# Patient Record
Sex: MALE | Race: WHITE | NOT HISPANIC OR LATINO
[De-identification: names, ages, dates, MRNs, and addresses within clinical notes are randomized per-mention and may not be internally consistent; named-entity substitution may affect disease eponyms.]

---

## 2017-01-06 ENCOUNTER — APPOINTMENT (OUTPATIENT)
Dept: ORTHOPEDIC SURGERY | Facility: CLINIC | Age: 67
End: 2017-01-06

## 2017-01-20 ENCOUNTER — APPOINTMENT (OUTPATIENT)
Dept: ORTHOPEDIC SURGERY | Facility: CLINIC | Age: 67
End: 2017-01-20

## 2017-02-03 ENCOUNTER — APPOINTMENT (OUTPATIENT)
Dept: ORTHOPEDIC SURGERY | Facility: CLINIC | Age: 67
End: 2017-02-03

## 2017-02-10 ENCOUNTER — APPOINTMENT (OUTPATIENT)
Dept: ORTHOPEDIC SURGERY | Facility: CLINIC | Age: 67
End: 2017-02-10

## 2017-02-17 ENCOUNTER — APPOINTMENT (OUTPATIENT)
Dept: ORTHOPEDIC SURGERY | Facility: CLINIC | Age: 67
End: 2017-02-17

## 2017-03-09 ENCOUNTER — APPOINTMENT (OUTPATIENT)
Dept: ORTHOPEDIC SURGERY | Facility: CLINIC | Age: 67
End: 2017-03-09

## 2017-03-09 VITALS — WEIGHT: 165 LBS | RESPIRATION RATE: 16 BRPM | BODY MASS INDEX: 25.59 KG/M2 | HEIGHT: 67.5 IN

## 2017-03-09 DIAGNOSIS — M65.341 TRIGGER FINGER, RIGHT RING FINGER: ICD-10-CM

## 2017-03-09 DIAGNOSIS — M72.0 PALMAR FASCIAL FIBROMATOSIS [DUPUYTREN]: ICD-10-CM

## 2017-03-09 DIAGNOSIS — R22.32 LOCALIZED SWELLING, MASS AND LUMP, LEFT UPPER LIMB: ICD-10-CM

## 2017-03-09 RX ORDER — MUPIROCIN 2 G/100G
2 CREAM TOPICAL
Qty: 15 | Refills: 0 | Status: ACTIVE | COMMUNITY
Start: 2017-03-08

## 2017-09-19 ENCOUNTER — APPOINTMENT (OUTPATIENT)
Dept: ORTHOPEDIC SURGERY | Facility: CLINIC | Age: 67
End: 2017-09-19

## 2017-09-26 ENCOUNTER — APPOINTMENT (OUTPATIENT)
Dept: ORTHOPEDIC SURGERY | Facility: CLINIC | Age: 67
End: 2017-09-26

## 2017-10-03 ENCOUNTER — APPOINTMENT (OUTPATIENT)
Dept: ORTHOPEDIC SURGERY | Facility: CLINIC | Age: 67
End: 2017-10-03

## 2018-01-19 ENCOUNTER — APPOINTMENT (OUTPATIENT)
Dept: ORTHOPEDIC SURGERY | Facility: CLINIC | Age: 68
End: 2018-01-19
Payer: COMMERCIAL

## 2018-01-19 VITALS — WEIGHT: 165 LBS | BODY MASS INDEX: 25.9 KG/M2 | HEIGHT: 67 IN

## 2018-01-19 PROCEDURE — 20610 DRAIN/INJ JOINT/BURSA W/O US: CPT | Mod: RT

## 2018-01-19 RX ORDER — AMLODIPINE BESYLATE 5 MG/1
5 TABLET ORAL
Qty: 30 | Refills: 0 | Status: ACTIVE | COMMUNITY
Start: 2017-03-29

## 2018-01-19 RX ORDER — AZITHROMYCIN 250 MG/1
250 TABLET, FILM COATED ORAL
Qty: 6 | Refills: 0 | Status: DISCONTINUED | COMMUNITY
Start: 2017-11-28

## 2018-01-19 RX ORDER — VALACYCLOVIR 500 MG/1
500 TABLET, FILM COATED ORAL
Qty: 30 | Refills: 0 | Status: ACTIVE | COMMUNITY
Start: 2017-06-23

## 2018-01-19 RX ORDER — VALSARTAN AND HYDROCHLOROTHIAZIDE 160; 25 MG/1; MG/1
160-25 TABLET, FILM COATED ORAL
Qty: 90 | Refills: 0 | Status: DISCONTINUED | COMMUNITY
Start: 2016-11-14 | End: 2018-01-19

## 2018-01-25 ENCOUNTER — APPOINTMENT (OUTPATIENT)
Dept: ORTHOPEDIC SURGERY | Facility: CLINIC | Age: 68
End: 2018-01-25
Payer: COMMERCIAL

## 2018-01-25 PROCEDURE — 20610 DRAIN/INJ JOINT/BURSA W/O US: CPT | Mod: RT

## 2018-02-13 ENCOUNTER — APPOINTMENT (OUTPATIENT)
Dept: ORTHOPEDIC SURGERY | Facility: CLINIC | Age: 68
End: 2018-02-13
Payer: COMMERCIAL

## 2018-02-13 VITALS — BODY MASS INDEX: 25.9 KG/M2 | WEIGHT: 165 LBS | HEIGHT: 67 IN

## 2018-02-13 PROCEDURE — 20610 DRAIN/INJ JOINT/BURSA W/O US: CPT | Mod: RT

## 2018-03-06 ENCOUNTER — APPOINTMENT (OUTPATIENT)
Dept: ORTHOPEDIC SURGERY | Facility: CLINIC | Age: 68
End: 2018-03-06

## 2018-03-13 ENCOUNTER — APPOINTMENT (OUTPATIENT)
Dept: ORTHOPEDIC SURGERY | Facility: CLINIC | Age: 68
End: 2018-03-13
Payer: COMMERCIAL

## 2018-03-13 VITALS — HEIGHT: 67 IN | WEIGHT: 165 LBS | BODY MASS INDEX: 25.9 KG/M2

## 2018-03-13 PROCEDURE — 99214 OFFICE O/P EST MOD 30 MIN: CPT

## 2018-12-21 ENCOUNTER — APPOINTMENT (OUTPATIENT)
Dept: ORTHOPEDIC SURGERY | Facility: CLINIC | Age: 68
End: 2018-12-21

## 2019-02-27 ENCOUNTER — APPOINTMENT (OUTPATIENT)
Dept: ORTHOPEDIC SURGERY | Facility: CLINIC | Age: 69
End: 2019-02-27
Payer: COMMERCIAL

## 2019-02-27 PROCEDURE — 20610 DRAIN/INJ JOINT/BURSA W/O US: CPT | Mod: RT

## 2019-03-01 NOTE — PROCEDURE
[de-identified] : cc: right knee pain and stiffness\par Dx; Right knee DJD \par \par Under strict sterile technique, the right knee was prepped with Betadine. Using the superolateral approach, with the patient supine, a 2mL injection of Euflexxa was administered intra-articularly . The patient tolerated the procedure well. The patient was instructed to avoid vigorous exercise for 24 hours and will apply ice to the knee for 20 minutes 2-3 times per day if discomfort occurs. Patient will return next week for the next injection in the series. The patient will call if any questions or problems should arise. \par \par [id number]\par 2019-12-09\par

## 2019-03-06 ENCOUNTER — APPOINTMENT (OUTPATIENT)
Dept: ORTHOPEDIC SURGERY | Facility: CLINIC | Age: 69
End: 2019-03-06

## 2019-03-15 ENCOUNTER — APPOINTMENT (OUTPATIENT)
Dept: ORTHOPEDIC SURGERY | Facility: CLINIC | Age: 69
End: 2019-03-15
Payer: COMMERCIAL

## 2019-03-15 PROCEDURE — 20610 DRAIN/INJ JOINT/BURSA W/O US: CPT | Mod: RT

## 2019-03-15 NOTE — PROCEDURE
[de-identified] : cc: right knee pain and stiffness\par Dx; Right knee DJD \par \par Under strict sterile technique, the right knee was prepped with Betadine. Using the superolateral approach, with the patient supine, a 2mL injection of Euflexxa was administered intra-articularly . The patient tolerated the procedure well. The patient was instructed to avoid vigorous exercise for 24 hours and will apply ice to the knee for 20 minutes 2-3 times per day if discomfort occurs. Patient will return next week for the next injection in the series. The patient will call if any questions or problems should arise. \par \par [id number]\par 2019-12-09\par

## 2019-03-22 ENCOUNTER — APPOINTMENT (OUTPATIENT)
Dept: ORTHOPEDIC SURGERY | Facility: CLINIC | Age: 69
End: 2019-03-22
Payer: COMMERCIAL

## 2019-03-22 PROCEDURE — 20610 DRAIN/INJ JOINT/BURSA W/O US: CPT | Mod: RT

## 2019-03-22 RX ORDER — ALPRAZOLAM 0.5 MG/1
0.5 TABLET ORAL
Qty: 30 | Refills: 0 | Status: COMPLETED | COMMUNITY
Start: 2018-10-10

## 2019-03-22 RX ORDER — ALPRAZOLAM 0.25 MG/1
0.25 TABLET ORAL
Qty: 60 | Refills: 0 | Status: ACTIVE | COMMUNITY
Start: 2019-03-05

## 2019-03-22 RX ORDER — SODIUM SULFATE, POTASSIUM SULFATE, MAGNESIUM SULFATE 17.5; 3.13; 1.6 G/ML; G/ML; G/ML
17.5-3.13-1.6 SOLUTION, CONCENTRATE ORAL
Qty: 354 | Refills: 0 | Status: COMPLETED | COMMUNITY
Start: 2018-12-10 | End: 2019-03-22

## 2019-03-22 RX ORDER — ALPRAZOLAM 2 MG/1
2 TABLET ORAL
Qty: 30 | Refills: 0 | Status: COMPLETED | COMMUNITY
Start: 2016-12-30 | End: 2019-03-22

## 2019-03-29 NOTE — PROCEDURE
[de-identified] : Indication: Right knee DJD\par CC: Right knee pain and stiffness\par \par The patient returns today for the third injection in the series. Under strict sterile technique, the right knee was prepped with Betadine. Using the superolateral approach, with the patient supine, a 2mL injection of Euflexxa was administered intra-articularly. The patient tolerated the procedure well. The patient was instructed to avoid vigorous exercise for 24 hours and will apply ice to the knee for 20 minutes 2-3 times per day if discomfort occurs. The patient will return for follow up on an as needed basis and will call if any additional questions or problems should arise.\par \par Euflexxa injection # 3 - Right Knee\par Lot #: [id number]\par Exp: 12-\par Man: Yehuda\par NDC: [id number]

## 2019-09-04 ENCOUNTER — RX RENEWAL (OUTPATIENT)
Age: 69
End: 2019-09-04

## 2019-09-05 ENCOUNTER — APPOINTMENT (OUTPATIENT)
Dept: OTOLARYNGOLOGY | Facility: CLINIC | Age: 69
End: 2019-09-05

## 2019-10-03 ENCOUNTER — APPOINTMENT (OUTPATIENT)
Dept: OTOLARYNGOLOGY | Facility: CLINIC | Age: 69
End: 2019-10-03
Payer: COMMERCIAL

## 2019-10-03 VITALS
DIASTOLIC BLOOD PRESSURE: 74 MMHG | WEIGHT: 165 LBS | RESPIRATION RATE: 53 BRPM | SYSTOLIC BLOOD PRESSURE: 94 MMHG | BODY MASS INDEX: 25.9 KG/M2 | HEIGHT: 67 IN

## 2019-10-03 DIAGNOSIS — H90.A21 SENSORINEURAL HEARING LOSS, UNILATERAL, RIGHT EAR, WITH RESTRICTED HEARING ON THE CONTRALATERAL SIDE: ICD-10-CM

## 2019-10-03 DIAGNOSIS — H90.8 MIXED CONDUCTIVE AND SENSORINEURAL HEARING LOSS, UNSPECIFIED: ICD-10-CM

## 2019-10-03 PROCEDURE — 92550 TYMPANOMETRY & REFLEX THRESH: CPT

## 2019-10-03 PROCEDURE — 99203 OFFICE O/P NEW LOW 30 MIN: CPT | Mod: 25

## 2019-10-03 PROCEDURE — 92504 EAR MICROSCOPY EXAMINATION: CPT

## 2019-10-03 PROCEDURE — 31231 NASAL ENDOSCOPY DX: CPT

## 2019-10-03 PROCEDURE — 92557 COMPREHENSIVE HEARING TEST: CPT

## 2019-10-03 RX ORDER — FLUTICASONE PROPIONATE 50 UG/1
50 SPRAY, METERED NASAL DAILY
Qty: 1 | Refills: 5 | Status: ACTIVE | COMMUNITY
Start: 2019-10-03 | End: 1900-01-01

## 2019-10-03 RX ORDER — TRETINOIN 0.2 MG/G
0.02 CREAM TOPICAL
Qty: 40 | Refills: 0 | Status: DISCONTINUED | COMMUNITY
Start: 2018-12-03 | End: 2019-10-03

## 2019-10-03 NOTE — ASSESSMENT
[FreeTextEntry1] : Significant hearing loss in the left ear with air-bone gaps. Etiologies discussed in detail with the patient and his wife. Management options reviewed in detail. He wished to consider surgical intervention.All risks limitations, complications and alternatives reviewed in detail.  Questions answered.\par \par CT scan of the temporal bone recommended. Followup in this office for a CT review recommended for further management discussion.\par \par Consistent steroidal nasal spray trial recommended for chronic rhinitis.

## 2019-10-03 NOTE — DATA REVIEWED
[de-identified] : Complete audiometry was ordered and completed today. This was separately reported by the audiologist. The results were reviewed in detail with the patient.\par \par

## 2019-10-03 NOTE — HISTORY OF PRESENT ILLNESS
[de-identified] : AMARILIS JARQUIN has a history of hearing loss in the left ear for about 1 year. Gradual onset for 2 - 3 years without precipitating event.  there has interfered with daily communications.  Patient reports no exposure to loud noises or previous trauma. No family history of hearing loss or deafness. No pain, no tinnitus and no vertigo reported at this visit. \par Long-standing history of nasal obstruction reported.

## 2019-10-03 NOTE — PHYSICAL EXAM
[Midline] : trachea located in midline position [Normal] : no rashes [FreeTextEntry1] : Procedure: Microscopic Ear Exam\par \par Left ear:  \par Ear canal patent. Tympanic membranes intact. Inferior myringosclerosis. No acute inflammation. No visible effusion or lesion.\par \par \par Right ear:  \par Ear canal patent. Mild tympanic membrane retraction the posterior superior quadrant and the pars flaccida region. No inflammation or lesion. Cannot rule out effusion.\par \par \par \par [place] Hearing Assessment\par 512 Hz:\par Otero test: referred to the left ear\par Rinne test:\par 	Right Ear: Air Conduction > Bone Conduction\par 	Left Ear:   Air Conduction < Bone conduction

## 2019-10-03 NOTE — CONSULT LETTER
[Please see my note below.] : Please see my note below. [FreeTextEntry1] : Thank you for allowing me to participate in the care of your patient.\par Please see the attached visit note.\par \par \par \par Ronal Alejandro\par Otology\par Department of Otolaryngology\par [facility]\par  [FreeTextEntry2] : Dear MAGDALENA ELIZONDO

## 2019-10-03 NOTE — PROCEDURE
[FreeTextEntry3] : PROCEDURE:  NASAL ENDOSCOPY  \par \par Surgeon: Dr. Alejandro\par Indication: Chronic Rhinitis\par Anesthetic: Topical lidocaine and Afrin\par Procedure: The patient was placed in a sitting position.  Following application of the topical anesthetic and decongestant, exam was performed with a flexible endoscope.  The scope was passed along the right nasal floor to the nasopharynx.  It was then passed into the region of the middle meatus, middle turbinate, and sphenoethmoid region.  An identical procedure was performed on the left side.  The following findings were noted:\par \par Nasal mucosa:   Mild edema\par Mucous: Normal\par \par Septum:[midline, leftward deviation, righward deviation]\par \par \par Right nasal cavity\par      Right middle meatus: no pus, polyps or congestion \par      Right sphenoethmoidal recess: no pus, polyps or congestion  \par      InferiorTurbinates: Intact and non inflammed\par      Middle Turbinates: Moderately enlarged with edema. No polypoid changes.\par      SuperiorTurbinates: Intact and non inflammed\par \par Left nasal cavity\par      Left middle meatus: no pus, polyps, or congestion\par      Left sphenoethmoidal recess: no pus, polyps or congestion \par      InferiorTurbinates: Intact and non inflammed  \par      Middle Turbinates:Moderately enlarged with edema. No polypoid changes.\par      SuperiorTurbinates: Intact and non inflammed\par \par Nasopharynx: no masses, choanae patent, no adenoid tissue\par

## 2019-10-15 ENCOUNTER — APPOINTMENT (OUTPATIENT)
Dept: ORTHOPEDIC SURGERY | Facility: CLINIC | Age: 69
End: 2019-10-15
Payer: COMMERCIAL

## 2019-10-15 PROCEDURE — 20610 DRAIN/INJ JOINT/BURSA W/O US: CPT | Mod: RT

## 2019-10-16 NOTE — PROCEDURE
[de-identified] : Indication: right knee pain and stiffness\par DX; Right knee DJD\par \par Under strict sterile technique, the right knee was prepped with Betadine. Using the superolateral approach, with the patient supine, a 2mL injection of Euflexxa was administered intra-articularly . The patient tolerated the procedure well. The patient was instructed to avoid vigorous exercise for 24 hours and will apply ice to the knee for 20 minutes 2-3 times per day if discomfort occurs. Patient will return next week for the next injection in the series. The patient will call if any questions or problems should arise. \par \par \par \par Euflexxa injection # 1 - Right knee Joint\par Lot #: [id number]\par Exp: 09-\par Man: Yehuda\par NDC: [id number]

## 2019-10-22 ENCOUNTER — APPOINTMENT (OUTPATIENT)
Dept: ORTHOPEDIC SURGERY | Facility: CLINIC | Age: 69
End: 2019-10-22
Payer: COMMERCIAL

## 2019-10-22 VITALS
HEIGHT: 67 IN | DIASTOLIC BLOOD PRESSURE: 70 MMHG | BODY MASS INDEX: 25.9 KG/M2 | WEIGHT: 165 LBS | SYSTOLIC BLOOD PRESSURE: 110 MMHG

## 2019-10-22 PROCEDURE — 20610 DRAIN/INJ JOINT/BURSA W/O US: CPT | Mod: RT

## 2019-10-23 NOTE — PROCEDURE
[de-identified] : Indication: right knee pain and stiffness\par The patient returns today for the second injection in the series. Under strict sterile technique, the right knee was prepped with Betadine. Using the superolateral approach, with the patient supine, a 2mL injection of  Euflexxa was administered intra-articularly . The patient tolerated the procedure well. The patient was instructed to avoid vigorous exercise for 24 hours and will apply ice to the knee for 20 minutes 2-3 times per day  if discomfort occurs. Patient will return next week for the next injection in the series. The patient will call if any questions or problems should arise. \par \par \par Euflexxa injection # 2 - Right knee joint\par Lot #: [id number]\par Exp: 09-\par Man: Yehuda\par NDC: [id number]

## 2019-10-24 ENCOUNTER — APPOINTMENT (OUTPATIENT)
Dept: OTOLARYNGOLOGY | Facility: CLINIC | Age: 69
End: 2019-10-24
Payer: COMMERCIAL

## 2019-10-24 VITALS
RESPIRATION RATE: 83 BRPM | DIASTOLIC BLOOD PRESSURE: 99 MMHG | WEIGHT: 165 LBS | BODY MASS INDEX: 25.9 KG/M2 | SYSTOLIC BLOOD PRESSURE: 152 MMHG | HEIGHT: 67 IN

## 2019-10-24 DIAGNOSIS — J30.0 VASOMOTOR RHINITIS: ICD-10-CM

## 2019-10-24 DIAGNOSIS — H90.A32 MIXED CONDUCTIVE AND SENSORINEURAL HEARING, UNILATERAL, LEFT EAR WITH RESTRICTED HEARING ON THE  CONTRALATERAL SIDE: ICD-10-CM

## 2019-10-24 DIAGNOSIS — H80.93 UNSPECIFIED OTOSCLEROSIS, BILATERAL: ICD-10-CM

## 2019-10-24 PROCEDURE — 99213 OFFICE O/P EST LOW 20 MIN: CPT

## 2019-10-24 NOTE — HISTORY OF PRESENT ILLNESS
[de-identified] : AMARILIS JARQUIN has a history of hearing loss in the left ear for about 1 year. Gradual onset for 2 - 3 years without precipitating event.  there has interfered with daily communications.  Patient reports no exposure to loud noises or previous trauma. No family history of hearing loss or deafness. No pain, no tinnitus and no vertigo reported at this visit. \par Long-standing history of nasal obstruction reported. [FreeTextEntry1] : 10/24/2019 Patient is being seen to review CT Scan. Patient reports of no perceived changes in the hearing. Compliant with the use of nasal spray. He feels that his nasal breathing has improved.

## 2019-10-24 NOTE — CONSULT LETTER
[Please see my note below.] : Please see my note below. [FreeTextEntry2] : Dear MAGDALENA ELIZONDO  [FreeTextEntry1] : Thank you for allowing me to participate in the care of AMARILIS JARQUIN .\par Please see the attached visit note.\par \par \par \par Ronal Alejandro\par Otology\par Department of Otolaryngology\par [facility]

## 2019-10-24 NOTE — ASSESSMENT
[FreeTextEntry1] : Management options carefully reviewed for left mixed hearing loss. I have encouraged him to consider a hearing aid trial. He may wish to consider surgical intervention. I have reviewed this with them in detail.\par \par Clinical followup recommended in 6 months.

## 2019-10-24 NOTE — PHYSICAL EXAM
[FreeTextEntry1] : Procedure: Microscopic Ear Exam\par \par Left ear:  Ear canal intact without inflammation or lesion.  \par Tympanic membrane intact without inflammation.\par \par Right ear:  Ear canal intact without inflammation or lesion.  \par Tympanic membrane intact without inflammation.\par \par  [Normal] : lingual tonsils are normal [Midline] : trachea located in midline position

## 2019-10-29 ENCOUNTER — APPOINTMENT (OUTPATIENT)
Dept: ORTHOPEDIC SURGERY | Facility: CLINIC | Age: 69
End: 2019-10-29
Payer: COMMERCIAL

## 2019-10-29 PROCEDURE — 20610 DRAIN/INJ JOINT/BURSA W/O US: CPT | Mod: RT

## 2019-10-29 RX ORDER — HYALURONATE SODIUM 20 MG/2 ML
20 SYRINGE (ML) INTRAARTICULAR
Qty: 1 | Refills: 0 | Status: COMPLETED | COMMUNITY
Start: 2019-09-04 | End: 2019-10-29

## 2019-10-31 NOTE — PROCEDURE
[de-identified] : Indication: right knee pain and stiffness\par \par The patient returns today for the third injection in the series. Under strict sterile technique, the right knee was prepped with Betadine. Using the superolateral approach, with the patient supine, a 2mL injection of Euflexxa was administered intra-articularly. The patient tolerated the procedure well. The patient was instructed to avoid vigorous exercise for 24 hours and will apply ice to the knee for 20 minutes 2-3 times per day if discomfort occurs. The patient will return for follow up on an as needed basis and will call if any additional questions or problems should arise.\par \par Euflexxa injection # 3 - Right knee joint\par Lot #: [id number]\par Exp: 09-\par Man: Yehuda\par NDC: [id number]

## 2019-11-07 ENCOUNTER — APPOINTMENT (OUTPATIENT)
Dept: OTOLARYNGOLOGY | Facility: CLINIC | Age: 69
End: 2019-11-07

## 2020-01-09 ENCOUNTER — APPOINTMENT (OUTPATIENT)
Dept: ORTHOPEDIC SURGERY | Facility: CLINIC | Age: 70
End: 2020-01-09
Payer: MEDICARE

## 2020-01-09 ENCOUNTER — APPOINTMENT (OUTPATIENT)
Dept: OTOLARYNGOLOGY | Facility: CLINIC | Age: 70
End: 2020-01-09

## 2020-01-09 VITALS
HEIGHT: 67 IN | SYSTOLIC BLOOD PRESSURE: 130 MMHG | DIASTOLIC BLOOD PRESSURE: 90 MMHG | BODY MASS INDEX: 25.9 KG/M2 | WEIGHT: 165 LBS | OXYGEN SATURATION: 98 % | HEART RATE: 74 BPM

## 2020-01-09 PROCEDURE — 99213 OFFICE O/P EST LOW 20 MIN: CPT

## 2020-01-21 NOTE — END OF VISIT
[FreeTextEntry3] : All medical record entries made by REBA Hutchins, acting as a scribe for this encounter under the direction of Federico Weber MD . I have reviewed the chart and agree that the record accurately reflects my personal performance of the history, physical exam, assessment and plan. I have also personally directed, reviewed, and agreed with the chart.

## 2020-01-21 NOTE — DISCUSSION/SUMMARY
[de-identified] : Chalino is doing well following his HA injections. He will continue on with activities as tolerated. We will see him back on an as needed basis. He will call if any issues arise.

## 2020-01-21 NOTE — HISTORY OF PRESENT ILLNESS
[de-identified] : sam returns today for evaluation of his right knee. he reports improvement from his HA injections. He denies any current pain or swelling. He denies any locking or buckling.

## 2020-04-30 ENCOUNTER — APPOINTMENT (OUTPATIENT)
Dept: OTOLARYNGOLOGY | Facility: CLINIC | Age: 70
End: 2020-04-30

## 2020-09-17 ENCOUNTER — APPOINTMENT (OUTPATIENT)
Dept: ORTHOPEDIC SURGERY | Facility: CLINIC | Age: 70
End: 2020-09-17

## 2020-09-24 ENCOUNTER — APPOINTMENT (OUTPATIENT)
Dept: ORTHOPEDIC SURGERY | Facility: CLINIC | Age: 70
End: 2020-09-24
Payer: MEDICARE

## 2020-09-24 PROCEDURE — 20610 DRAIN/INJ JOINT/BURSA W/O US: CPT | Mod: RT

## 2020-09-25 NOTE — PROCEDURE
[de-identified] : cc; Right knee pain and stiffness\par \par Under strict sterile technique, the right knee was prepped with Betadine. Using the superolateral approach, with the patient supine, a 2mL injection of Euflexxa was administered intra-articularly . The patient tolerated the procedure well. The patient was instructed to avoid vigorous exercise for 24 hours and will apply ice to the knee for 20 minutes 2-3 times per day if discomfort occurs. Patient will return next week for the next injection in the series. The patient will call if any questions or problems should arise.\par \par \par euflexxa \par right knee \par 1 of 3\par Lot: [id number]\par exp: 2021-03-21\par ndc: [id number]

## 2020-10-01 ENCOUNTER — APPOINTMENT (OUTPATIENT)
Dept: ORTHOPEDIC SURGERY | Facility: CLINIC | Age: 70
End: 2020-10-01
Payer: MEDICARE

## 2020-10-01 VITALS
TEMPERATURE: 97.8 F | HEART RATE: 91 BPM | BODY MASS INDEX: 25.9 KG/M2 | SYSTOLIC BLOOD PRESSURE: 120 MMHG | HEIGHT: 67 IN | OXYGEN SATURATION: 97 % | WEIGHT: 165 LBS | DIASTOLIC BLOOD PRESSURE: 70 MMHG

## 2020-10-01 PROCEDURE — 20610 DRAIN/INJ JOINT/BURSA W/O US: CPT | Mod: RT

## 2020-10-02 NOTE — PROCEDURE
[de-identified] : Indication: Pain and stiffness of the right knee\par \par The patient returns today for the second injection in the series. Under strict sterile technique, the right knee was prepped with Betadine. Using the superolateral approach, with the patient supine, a 2mL injection of Euflexxa was administered intra-articularly . The patient tolerated the procedure well. The patient was instructed to avoid vigorous exercise for 24 hours and will apply ice to the knee for 20 minutes 2-3 times per day if discomfort occurs. Patient will return next week for the next injection in the series. The patient will call if any questions or problems should arise.\par \par Euflexxa injection # 2 - Right knee joint\par Lot #: [id number]\par Exp: 03-\par Man: Yehuda\par NDC: [id number]

## 2020-10-15 ENCOUNTER — APPOINTMENT (OUTPATIENT)
Dept: ORTHOPEDIC SURGERY | Facility: CLINIC | Age: 70
End: 2020-10-15
Payer: MEDICARE

## 2020-10-15 VITALS
HEART RATE: 92 BPM | HEIGHT: 67 IN | WEIGHT: 165 LBS | BODY MASS INDEX: 25.9 KG/M2 | SYSTOLIC BLOOD PRESSURE: 110 MMHG | TEMPERATURE: 97.9 F | DIASTOLIC BLOOD PRESSURE: 70 MMHG | OXYGEN SATURATION: 96 %

## 2020-10-15 PROCEDURE — 20610 DRAIN/INJ JOINT/BURSA W/O US: CPT | Mod: RT

## 2020-10-15 RX ORDER — HYALURONATE SODIUM 20 MG/2 ML
20 SYRINGE (ML) INTRAARTICULAR
Qty: 1 | Refills: 0 | Status: COMPLETED | COMMUNITY
Start: 2020-04-16 | End: 2020-10-15

## 2020-10-16 NOTE — PROCEDURE
[de-identified] : cc; Right knee pain and stiffness\par Dx; Right knee DJD \par \par The patient returns today for the third injection in the series. Under strict sterile technique, the right knee was prepped with Betadine. Using the superolateral approach, with the patient supine, a 2mL injection of Euflexxa was administered intra-articularly. The patient tolerated the procedure well. The patient was instructed to avoid vigorous exercise for 24 hours and will apply ice to the knee for 20 minutes 2-3 times per day if discomfort occurs. The patient will return for follow up on an as needed basis and will call if any additional questions or problems should arise.\par Euflexxa injection # 3 - Right knee joint\par Lot #: [id number]\par Exp: 03-\par Man: Yehuda\par NDC: [id number]

## 2021-01-01 NOTE — PHYSICAL EXAM
[de-identified] : The patient is a well developed, well nourished male in no apparent distress. He is alert and oriented X 3 with a pleasant mood and appropriate affect. \par \par On physical examination of the right knee, there is full range of motion. The patient walks with a normal gait and stands in neutral alignment. There is no effusion. No warmth or erythema is noted. The patella is non tender to palpation medially or laterally. There is no crepitus noted. The apprehension and grind tests are negative. The extensor mechanism is intact. There is medial joint line tenderness. The Viktor sign is absent. The Lachman and pivot shift tests are negative. There is no varus or valgus laxity at 0 or 30 degrees. No posterolateral or anteromedial laxity is noted. No masses are palpable. No other soft tissue or bony tenderness is noted. Quadriceps weakness is noted. Neurovascular function is intact. \par 
Detailed exam

## 2021-04-20 ENCOUNTER — APPOINTMENT (OUTPATIENT)
Dept: ORTHOPEDIC SURGERY | Facility: CLINIC | Age: 71
End: 2021-04-20

## 2021-04-27 ENCOUNTER — APPOINTMENT (OUTPATIENT)
Dept: ORTHOPEDIC SURGERY | Facility: CLINIC | Age: 71
End: 2021-04-27

## 2021-05-04 ENCOUNTER — APPOINTMENT (OUTPATIENT)
Dept: ORTHOPEDIC SURGERY | Facility: CLINIC | Age: 71
End: 2021-05-04

## 2021-06-15 ENCOUNTER — APPOINTMENT (OUTPATIENT)
Dept: ORTHOPEDIC SURGERY | Facility: CLINIC | Age: 71
End: 2021-06-15
Payer: MEDICARE

## 2021-06-15 VITALS
SYSTOLIC BLOOD PRESSURE: 110 MMHG | DIASTOLIC BLOOD PRESSURE: 70 MMHG | HEIGHT: 67 IN | HEART RATE: 82 BPM | WEIGHT: 165 LBS | BODY MASS INDEX: 25.9 KG/M2 | TEMPERATURE: 98.2 F | OXYGEN SATURATION: 98 %

## 2021-06-15 PROCEDURE — 20610 DRAIN/INJ JOINT/BURSA W/O US: CPT | Mod: RT

## 2021-06-16 NOTE — PROCEDURE
[de-identified] : cc: right knee pain and stiffness\par DX: Right knee DJD \par \par Under strict sterile technique, the right knee was prepped with Betadine. Using the superolateral approach, with the patient supine, a 2mL injection of Euflexxa was administered intra-articularly . The patient tolerated the procedure well. The patient was instructed to avoid vigorous exercise for 24 hours and will apply ice to the knee for 20 minutes 2-3 times per day if discomfort occurs. Patient will return next week for the next injection in the series. The patient will call if any questions or problems should arise.\par \par Euflexxa injection # 1 - Right knee joint\par Lot #: [id number]\par Exp: 03-\par Man: Yehuda\par NDC: [id number]

## 2021-06-22 ENCOUNTER — APPOINTMENT (OUTPATIENT)
Dept: ORTHOPEDIC SURGERY | Facility: CLINIC | Age: 71
End: 2021-06-22
Payer: MEDICARE

## 2021-06-22 VITALS
OXYGEN SATURATION: 98 % | HEIGHT: 67 IN | BODY MASS INDEX: 25.9 KG/M2 | HEART RATE: 80 BPM | WEIGHT: 165 LBS | TEMPERATURE: 98.1 F

## 2021-06-22 PROCEDURE — 20610 DRAIN/INJ JOINT/BURSA W/O US: CPT | Mod: RT

## 2021-06-23 NOTE — PROCEDURE
[de-identified] : cc: right knee pain and stiffness\par DX: right knee DJD \par \par The patient returns today for the second injection in the series. Under strict sterile technique, the right knee was prepped with Betadine. Using the superolateral approach, with the patient supine, a 2mL injection of  Euflexxa was administered intra-articularly . The patient tolerated the procedure well. The patient was instructed to avoid vigorous exercise for 24 hours and will apply ice to the knee for 20 minutes 2-3 times per day if discomfort occurs. Patient will return next week for the next injection in the series. The patient will call if any questions or problems should arise\par \par Euflexxa injection # 2 - Right knee joint\par Lot #; [id number]\par Exp: 03-\par Man: Yehuda\par NDC: [id number]

## 2021-06-29 ENCOUNTER — APPOINTMENT (OUTPATIENT)
Dept: ORTHOPEDIC SURGERY | Facility: CLINIC | Age: 71
End: 2021-06-29
Payer: MEDICARE

## 2021-06-29 PROCEDURE — 20610 DRAIN/INJ JOINT/BURSA W/O US: CPT | Mod: RT

## 2021-06-29 NOTE — PROCEDURE
[de-identified] : cc: right knee pain and stiffness\par DX; Right knee DJD \par \par Under strict sterile technique, the right knee was prepped with Betadine. Using the superolateral approach, with the patient supine, a 2mL injection of Euflexxa was administered intra-articularly . The patient tolerated the procedure well. The patient was instructed to avoid vigorous exercise for 24 hours and will apply ice to the knee for 20 minutes 2-3 times per day if discomfort occurs. Patient will return on an as needed basis. The patient will call if any questions or problems should arise. \par \par Euflexxa injection # 3 - Right knee joint\par Lot #: [id number]\par Exp: 03-\par Man: Yehuda\par NDC: [id number]

## 2021-07-06 ENCOUNTER — APPOINTMENT (OUTPATIENT)
Dept: ORTHOPEDIC SURGERY | Facility: CLINIC | Age: 71
End: 2021-07-06

## 2022-03-22 ENCOUNTER — APPOINTMENT (OUTPATIENT)
Dept: ORTHOPEDIC SURGERY | Facility: CLINIC | Age: 72
End: 2022-03-22
Payer: MEDICARE

## 2022-03-22 VITALS
BODY MASS INDEX: 25.9 KG/M2 | DIASTOLIC BLOOD PRESSURE: 74 MMHG | HEIGHT: 67 IN | TEMPERATURE: 98 F | WEIGHT: 165 LBS | SYSTOLIC BLOOD PRESSURE: 110 MMHG

## 2022-03-22 PROCEDURE — 20610 DRAIN/INJ JOINT/BURSA W/O US: CPT | Mod: RT

## 2022-03-23 NOTE — PROCEDURE
[de-identified] : cc; Right knee pain and stiffness\par dx: right knee DJD \par \par Under strict sterile technique, the right knee was prepped with Betadine. Using the superolateral approach, with the patient supine, a 2mL injection of Euflexxa was administered intra-articularly . The patient tolerated the procedure well. The patient was instructed to avoid vigorous exercise for 24 hours and will apply ice to the knee for 20 minutes 2-3 times per day if discomfort occurs. Patient will return next week for the next injection in the series. The patient will call if any questions or problems should arise.\par \par Euflexxa injection # 1 - Right knee joint\par Lot #: [id number]\par Exp: 10-\par Man: Yehuda\par NDC: [id number]

## 2022-03-29 ENCOUNTER — APPOINTMENT (OUTPATIENT)
Dept: ORTHOPEDIC SURGERY | Facility: CLINIC | Age: 72
End: 2022-03-29
Payer: MEDICARE

## 2022-03-29 PROCEDURE — 20610 DRAIN/INJ JOINT/BURSA W/O US: CPT | Mod: RT

## 2022-03-30 NOTE — PROCEDURE
[de-identified] : cc: right knee pain and stiffness\par DX: right knee DJD \par \par The patient returns today for the second injection in the series. Under strict sterile technique, the right knee was prepped with Betadine. Using the superolateral approach, with the patient supine, a 2mL injection of Euflexxa was administered intra-articularly . The patient tolerated the procedure well. The patient was instructed to avoid vigorous exercise for 24 hours and will apply ice to the knee for 20 minutes 2-3 times per day if discomfort occurs. Patient will return next week for the next injection in the series. The patient will call if any questions or problems should arise.\par \par Euflexxa injection # 2 - Right knee joint\par LOT #: [id number]\par EXP: 10-\par MAN: JEN\par NDC: [id number]

## 2022-04-05 ENCOUNTER — RESULT REVIEW (OUTPATIENT)
Age: 72
End: 2022-04-05

## 2022-04-05 ENCOUNTER — OUTPATIENT (OUTPATIENT)
Dept: OUTPATIENT SERVICES | Facility: HOSPITAL | Age: 72
LOS: 1 days | End: 2022-04-05
Payer: MEDICARE

## 2022-04-05 ENCOUNTER — APPOINTMENT (OUTPATIENT)
Dept: ORTHOPEDIC SURGERY | Facility: CLINIC | Age: 72
End: 2022-04-05
Payer: MEDICARE

## 2022-04-05 PROCEDURE — 73564 X-RAY EXAM KNEE 4 OR MORE: CPT | Mod: 26,50

## 2022-04-05 PROCEDURE — 20610 DRAIN/INJ JOINT/BURSA W/O US: CPT | Mod: RT

## 2022-04-05 PROCEDURE — 73564 X-RAY EXAM KNEE 4 OR MORE: CPT

## 2022-04-06 NOTE — PROCEDURE
[de-identified] : cc: right knee pain and stiffness\par Dx; right knee DJD \par \par The patient returns today for the third injection in the series. Under strict sterile technique, the right knee was prepped with Betadine. Using the superolateral approach, with the patient supine, a 2mL injection of Euflexxa was administered intra-articularly. The patient tolerated the procedure well. The patient was instructed to avoid vigorous exercise for 24 hours and will apply ice to the knee for 20 minutes 2-3 times per day if discomfort occurs. The patient will return for follow up on an as needed basis and will call if any additional questions or problems should arise.\par \par Euflexxa injection # 3 - Right knee joint\par Lot #: [id number]\par Exp: 10-\par Man: Yehuda\par NDC: [id number]\par \par Radiographs of both knees performed today show moderate medial compartment DJD in her right knee

## 2022-11-01 ENCOUNTER — APPOINTMENT (OUTPATIENT)
Dept: ORTHOPEDIC SURGERY | Facility: CLINIC | Age: 72
End: 2022-11-01

## 2022-11-01 PROCEDURE — 20610 DRAIN/INJ JOINT/BURSA W/O US: CPT | Mod: RT

## 2022-11-02 NOTE — PROCEDURE
[de-identified] : cc: right knee pain and stiffness\par DX: right knee DJD \par \par Under strict sterile technique, the right knee was prepped with Betadine. Using the superolateral approach, with the patient supine, a 2mL injection of Euflexxa was administered intra-articularly . The patient tolerated the procedure well. The patient was instructed to avoid vigorous exercise for 24 hours and will apply ice to the knee for 20 minutes 2-3 times per day if discomfort occurs. Patient will return next week for the next injection in the series. The patient will call if any questions or problems should arise.\par \par Euflexxa injection # 1 - Right knee joint\par Lot #: [id number]\par Exp: 02-\par Man: Yehuda\par NDC: [address]-4

## 2022-11-07 RX ORDER — VALSARTAN 160 MG/1
160 TABLET, COATED ORAL
Qty: 30 | Refills: 0 | Status: DISCONTINUED | COMMUNITY
Start: 2017-02-15 | End: 2022-11-07

## 2022-11-07 RX ORDER — VALSARTAN 320 MG/1
320 TABLET, COATED ORAL
Qty: 30 | Refills: 0 | Status: DISCONTINUED | COMMUNITY
Start: 2017-03-08 | End: 2022-11-07

## 2022-11-07 RX ORDER — MUPIROCIN 20 MG/G
2 OINTMENT TOPICAL
Qty: 22 | Refills: 0 | Status: ACTIVE | COMMUNITY
Start: 2021-11-13

## 2022-11-07 RX ORDER — TAMSULOSIN HYDROCHLORIDE 0.4 MG/1
0.4 CAPSULE ORAL
Qty: 90 | Refills: 0 | Status: ACTIVE | COMMUNITY
Start: 2022-07-13

## 2022-11-07 RX ORDER — OLMESARTAN MEDOXOMIL 20 MG/1
20 TABLET, FILM COATED ORAL
Qty: 30 | Refills: 0 | Status: ACTIVE | COMMUNITY
Start: 2022-06-03

## 2022-11-07 RX ORDER — TADALAFIL 5 MG/1
5 TABLET ORAL
Qty: 30 | Refills: 0 | Status: ACTIVE | COMMUNITY
Start: 2022-10-14

## 2022-11-07 RX ORDER — VALSARTAN 80 MG/1
80 TABLET, COATED ORAL
Qty: 60 | Refills: 0 | Status: ACTIVE | COMMUNITY
Start: 2022-09-16

## 2022-11-07 RX ORDER — FLUTICASONE FUROATE AND VILANTEROL TRIFENATATE 100; 25 UG/1; UG/1
100-25 POWDER RESPIRATORY (INHALATION)
Qty: 60 | Refills: 0 | Status: ACTIVE | COMMUNITY
Start: 2022-09-13

## 2022-11-07 RX ORDER — ALBUTEROL SULFATE 90 UG/1
108 (90 BASE) INHALANT RESPIRATORY (INHALATION)
Qty: 8 | Refills: 0 | Status: ACTIVE | COMMUNITY
Start: 2022-09-16

## 2022-11-08 ENCOUNTER — APPOINTMENT (OUTPATIENT)
Dept: ORTHOPEDIC SURGERY | Facility: CLINIC | Age: 72
End: 2022-11-08

## 2022-11-08 PROCEDURE — 20610 DRAIN/INJ JOINT/BURSA W/O US: CPT | Mod: RT

## 2022-11-09 NOTE — PROCEDURE
[de-identified] : cc" right knee pain and stiffness\par DX: right knee DJD \par \par The patient returns today for the second injection in the series. Under strict sterile technique, the right knee was prepped with Betadine. Using the superolateral approach, with the patient supine, a 2mL injection of Euflexxa was administered intra-articularly . The patient tolerated the procedure well. The patient was instructed to avoid vigorous exercise for 24 hours and will apply ice to the knee for 20 minutes 2-3 times per day if discomfort occurs. Patient will return next week for the next injection in the series. The patient will call if any questions or problems should arise.\par \par Euflexxa injection # 2 - Right knee joint\par Lot #: [id number]\par Exp: 02-\par Man: Yehuda\par NDC: [id number]

## 2022-11-15 ENCOUNTER — APPOINTMENT (OUTPATIENT)
Dept: ORTHOPEDIC SURGERY | Facility: CLINIC | Age: 72
End: 2022-11-15

## 2022-11-15 NOTE — PROCEDURE
[de-identified] : cc: right knee pain and stiffness\par Dx:  Right knee DJD\par \par The patient returns today for the third and last injection of this series.\par Under strict sterile technique, the right knee was prepped with ChloraPrep. Using the superolateral approach, with the patient supine, a 2mL injection of Euflexxa was administered intra-articularly.  The patient tolerated the procedure well.  The patient was instructed to avoid vigorous exercise for 48 hours and will apply ice for 20 minutes 2-3 times per day if discomfort occurs.  Patient will return on an as needed basis or sooner if any problems arise.\par \par Euflexxa injection # 3 - Right knee joint\par Lot #: [id number]\par Exp: 02-\par Man: Yehuda\par NDC: [id number]

## 2022-11-16 RX ORDER — CIPROFLOXACIN HYDROCHLORIDE 500 MG/1
500 TABLET, FILM COATED ORAL
Qty: 20 | Refills: 0 | Status: COMPLETED | COMMUNITY
Start: 2022-10-31 | End: 2022-11-16

## 2022-11-16 RX ORDER — OFLOXACIN OTIC 3 MG/ML
0.3 SOLUTION AURICULAR (OTIC)
Qty: 10 | Refills: 0 | Status: COMPLETED | COMMUNITY
Start: 2022-10-31 | End: 2022-11-16

## 2022-11-16 RX ORDER — CIPROFLOXACIN AND DEXAMETHASONE 3; 1 MG/ML; MG/ML
0.3-0.1 SUSPENSION/ DROPS AURICULAR (OTIC)
Qty: 8 | Refills: 0 | Status: COMPLETED | COMMUNITY
Start: 2022-10-30 | End: 2022-11-16

## 2022-11-16 RX ORDER — CEFPODOXIME PROXETIL 200 MG/1
200 TABLET, FILM COATED ORAL
Qty: 14 | Refills: 0 | Status: COMPLETED | COMMUNITY
Start: 2022-09-13 | End: 2022-11-16

## 2022-11-16 RX ORDER — AZELASTINE HYDROCHLORIDE 137 UG/1
137 SPRAY, METERED NASAL
Qty: 30 | Refills: 0 | Status: COMPLETED | COMMUNITY
Start: 2018-09-05 | End: 2022-11-16

## 2022-11-16 RX ORDER — FLUOCINONIDE 0.5 MG/ML
0.05 SOLUTION TOPICAL
Qty: 60 | Refills: 0 | Status: ACTIVE | COMMUNITY
Start: 2022-08-17

## 2022-11-16 RX ORDER — PREDNISONE 20 MG/1
20 TABLET ORAL
Qty: 30 | Refills: 0 | Status: COMPLETED | COMMUNITY
Start: 2022-09-13 | End: 2022-11-16

## 2022-11-16 RX ORDER — PREDNISONE 10 MG/1
10 TABLET ORAL
Qty: 30 | Refills: 0 | Status: ACTIVE | COMMUNITY
Start: 2022-11-07

## 2023-09-19 ENCOUNTER — APPOINTMENT (OUTPATIENT)
Dept: ORTHOPEDIC SURGERY | Facility: CLINIC | Age: 73
End: 2023-09-19
Payer: MEDICARE

## 2023-09-19 PROCEDURE — 20610 DRAIN/INJ JOINT/BURSA W/O US: CPT | Mod: RT

## 2023-09-26 ENCOUNTER — APPOINTMENT (OUTPATIENT)
Dept: ORTHOPEDIC SURGERY | Facility: CLINIC | Age: 73
End: 2023-09-26
Payer: MEDICARE

## 2023-09-26 PROCEDURE — 20610 DRAIN/INJ JOINT/BURSA W/O US: CPT | Mod: RT

## 2023-10-03 ENCOUNTER — APPOINTMENT (OUTPATIENT)
Dept: ORTHOPEDIC SURGERY | Facility: CLINIC | Age: 73
End: 2023-10-03
Payer: MEDICARE

## 2023-10-03 ENCOUNTER — APPOINTMENT (OUTPATIENT)
Dept: ORTHOPEDIC SURGERY | Facility: CLINIC | Age: 73
End: 2023-10-03

## 2023-10-03 DIAGNOSIS — M17.11 UNILATERAL PRIMARY OSTEOARTHRITIS, RIGHT KNEE: ICD-10-CM

## 2023-10-03 PROCEDURE — 20610 DRAIN/INJ JOINT/BURSA W/O US: CPT | Mod: RT

## 2023-12-19 PROBLEM — I10 BENIGN ESSENTIAL HTN: Status: ACTIVE | Noted: 2023-12-19

## 2023-12-19 PROBLEM — Z13.220 LIPID SCREENING: Status: ACTIVE | Noted: 2023-12-19

## 2023-12-19 PROBLEM — Z12.11 COLON CANCER SCREENING: Status: ACTIVE | Noted: 2023-12-19

## 2023-12-19 PROBLEM — R39.9 LOWER URINARY TRACT SYMPTOMS (LUTS): Status: ACTIVE | Noted: 2023-12-19

## 2023-12-19 PROBLEM — Z23 ENCOUNTER FOR IMMUNIZATION: Status: ACTIVE | Noted: 2023-12-19

## 2023-12-19 PROBLEM — Z13.1 DIABETES MELLITUS SCREENING: Status: ACTIVE | Noted: 2023-12-19

## 2023-12-19 NOTE — HISTORY OF PRESENT ILLNESS
[FreeTextEntry1] : 73-year-old male presents to establish medical care and for initial examination. [de-identified] : Patient currently on treatment for HTN (amlodipine 5 mg), bronchospastic lung disease (Breo and albuterol), and LUTS (tadalafil 5 mg and tamsulosin).

## 2023-12-19 NOTE — ASSESSMENT
[FreeTextEntry1] : Health Maintenance His BMI is good and no weight loss is currently recommended. Daily aerobic exercises strongly recommended. No STD risk or substance abuse per patient report. Occasional gender specific self-examination is suggested. Hepatitis C antibody sent with patient approval. No depression. Competent with ADLs. Colonoscopy is not due this year. Yearly flu vaccine is recommended.  HTN  LUTS  Asthma

## 2023-12-20 ENCOUNTER — APPOINTMENT (OUTPATIENT)
Dept: NEPHROLOGY | Facility: CLINIC | Age: 73
End: 2023-12-20

## 2023-12-21 ENCOUNTER — APPOINTMENT (OUTPATIENT)
Dept: INTERNAL MEDICINE | Facility: CLINIC | Age: 73
End: 2023-12-21

## 2023-12-21 DIAGNOSIS — I10 ESSENTIAL (PRIMARY) HYPERTENSION: ICD-10-CM

## 2023-12-21 DIAGNOSIS — R39.9 UNSPECIFIED SYMPTOMS AND SIGNS INVOLVING THE GENITOURINARY SYSTEM: ICD-10-CM

## 2023-12-21 DIAGNOSIS — Z23 ENCOUNTER FOR IMMUNIZATION: ICD-10-CM

## 2023-12-21 DIAGNOSIS — Z13.1 ENCOUNTER FOR SCREENING FOR DIABETES MELLITUS: ICD-10-CM

## 2023-12-21 DIAGNOSIS — Z13.220 ENCOUNTER FOR SCREENING FOR LIPOID DISORDERS: ICD-10-CM

## 2023-12-21 DIAGNOSIS — Z12.11 ENCOUNTER FOR SCREENING FOR MALIGNANT NEOPLASM OF COLON: ICD-10-CM

## 2024-01-23 ENCOUNTER — APPOINTMENT (OUTPATIENT)
Dept: INTERNAL MEDICINE | Facility: CLINIC | Age: 74
End: 2024-01-23

## 2024-04-15 ENCOUNTER — APPOINTMENT (OUTPATIENT)
Dept: NEPHROLOGY | Facility: CLINIC | Age: 74
End: 2024-04-15

## 2024-07-08 ENCOUNTER — APPOINTMENT (OUTPATIENT)
Dept: ORTHOPEDIC SURGERY | Facility: CLINIC | Age: 74
End: 2024-07-08
Payer: MEDICARE

## 2024-07-08 VITALS
DIASTOLIC BLOOD PRESSURE: 92 MMHG | OXYGEN SATURATION: 96 % | HEIGHT: 67 IN | TEMPERATURE: 97.8 F | WEIGHT: 165 LBS | BODY MASS INDEX: 25.9 KG/M2 | HEART RATE: 74 BPM | SYSTOLIC BLOOD PRESSURE: 142 MMHG

## 2024-07-08 DIAGNOSIS — M17.11 UNILATERAL PRIMARY OSTEOARTHRITIS, RIGHT KNEE: ICD-10-CM

## 2024-07-08 PROCEDURE — 20610 DRAIN/INJ JOINT/BURSA W/O US: CPT | Mod: RT

## 2024-07-25 ENCOUNTER — APPOINTMENT (OUTPATIENT)
Dept: ORTHOPEDIC SURGERY | Facility: CLINIC | Age: 74
End: 2024-07-25
Payer: MEDICARE

## 2024-07-25 PROCEDURE — 20610 DRAIN/INJ JOINT/BURSA W/O US: CPT | Mod: RT

## 2024-07-29 NOTE — PROCEDURE
[de-identified] : Jonah returns today to begin a new cycle of Euflexxa injections. He has symptomatic DJD In his right knee. He has had nine months of effective symptomatic improvement from his last series of Euflexxa injections in October 2023. He has developed a recent increase in stiffness and pain in his right knee. He has modified his activities, applied ice and took some Tylenol without much relief.  CC: right knee pain and stiffness DX: right knee DJD  Under strict sterile technique, the right knee was prepped with Chloraprep. Using the superolateral approach, with the patient supine, a 2mL injection of Euflexxa was administered intra-articularly. The patient tolerated the procedure well. The patient was instructed to avoid vigorous exercise for 24 hours and will apply ice to the knee for 20 minutes 2-3 times per day if discomfort occurs. Patient will return next week for the next injection in the series. The patient will call if any questions or problems should arise.  injection was administered by Federico Weber MD   Euflexxa injection # 2 - Right knee joint Lot #: [id number] Exp: 05- Man: Yehuda NDC: [id number]

## 2024-07-29 NOTE — PROCEDURE
[de-identified] : Jonah returns today to begin a new cycle of Euflexxa injections. He has symptomatic DJD In his right knee. He has had nine months of effective symptomatic improvement from his last series of Euflexxa injections in October 2023. He has developed a recent increase in stiffness and pain in his right knee. He has modified his activities, applied ice and took some Tylenol without much relief.  CC: right knee pain and stiffness DX: right knee DJD  Under strict sterile technique, the right knee was prepped with Chloraprep. Using the superolateral approach, with the patient supine, a 2mL injection of Euflexxa was administered intra-articularly. The patient tolerated the procedure well. The patient was instructed to avoid vigorous exercise for 24 hours and will apply ice to the knee for 20 minutes 2-3 times per day if discomfort occurs. Patient will return next week for the next injection in the series. The patient will call if any questions or problems should arise.  injection was administered by Federico Weber MD   Euflexxa injection # 2 - Right knee joint Lot #: [id number] Exp: 05- Man: Yehuda NDC: [id number]

## 2024-07-29 NOTE — PROCEDURE
[de-identified] : Jonah returns today to begin a new cycle of Euflexxa injections. He has symptomatic DJD In his right knee. He has had nine months of effective symptomatic improvement from his last series of Euflexxa injections in October 2023. He has developed a recent increase in stiffness and pain in his right knee. He has modified his activities, applied ice and took some Tylenol without much relief.  CC: right knee pain and stiffness DX: right knee DJD  Under strict sterile technique, the right knee was prepped with Chloraprep. Using the superolateral approach, with the patient supine, a 2mL injection of Euflexxa was administered intra-articularly. The patient tolerated the procedure well. The patient was instructed to avoid vigorous exercise for 24 hours and will apply ice to the knee for 20 minutes 2-3 times per day if discomfort occurs. Patient will return next week for the next injection in the series. The patient will call if any questions or problems should arise.  injection was administered by Federico Weber MD   Euflexxa injection # 2 - Right knee joint Lot #: [id number] Exp: 05- Man: Yehuda NDC: [id number]

## 2024-08-01 ENCOUNTER — APPOINTMENT (OUTPATIENT)
Dept: ORTHOPEDIC SURGERY | Facility: CLINIC | Age: 74
End: 2024-08-01
Payer: MEDICARE

## 2024-08-01 DIAGNOSIS — M17.11 UNILATERAL PRIMARY OSTEOARTHRITIS, RIGHT KNEE: ICD-10-CM

## 2024-08-01 PROCEDURE — 20610 DRAIN/INJ JOINT/BURSA W/O US: CPT | Mod: RT

## 2024-08-02 NOTE — END OF VISIT
[FreeTextEntry3] : Dr Weber personally administered the injection today.Dr Weber has reviewed the chart and agrees that the record accurately reflects his personal performance of the history, physical exam, assessment, and plan. He personally directed, reviewed, and agreed with the chart.All medical record entries made by REBA Hutchins, acting as a scribe for this encounter under the direction of Federico Weber MD

## 2024-08-02 NOTE — PROCEDURE
[de-identified] : Jonah returns today to begin a new cycle of Euflexxa injections. He has symptomatic DJD In his right knee. He has had nine months of effective symptomatic improvement from his last series of Euflexxa injections in October 2023. He has developed a recent increase in stiffness and pain in his right knee. He has modified his activities, applied ice and took some Tylenol without much relief.  CC: right knee pain and stiffness DX: right knee DJD  Under strict sterile technique, the right knee was prepped with Chloraprep. Using the superolateral approach, with the patient supine, a 2mL injection of Euflexxa was administered intra-articularly. The patient tolerated the procedure well. The patient was instructed to avoid vigorous exercise for 24 hours and will apply ice to the knee for 20 minutes 2-3 times per day if discomfort occurs. Patient will return next week for the next injection in the series. The patient will call if any questions or problems should arise.  injection was administered by Federico Weber MD Euflexxa injection # 3 - Right knee joint Lot #: [id number] Exp: 05- Man: Yehuda NDC: [id number].
[de-identified] : Jonah returns today to begin a new cycle of Euflexxa injections. He has symptomatic DJD In his right knee. He has had nine months of effective symptomatic improvement from his last series of Euflexxa injections in October 2023. He has developed a recent increase in stiffness and pain in his right knee. He has modified his activities, applied ice and took some Tylenol without much relief.  CC: right knee pain and stiffness DX: right knee DJD  Under strict sterile technique, the right knee was prepped with Chloraprep. Using the superolateral approach, with the patient supine, a 2mL injection of Euflexxa was administered intra-articularly. The patient tolerated the procedure well. The patient was instructed to avoid vigorous exercise for 24 hours and will apply ice to the knee for 20 minutes 2-3 times per day if discomfort occurs. Patient will return next week for the next injection in the series. The patient will call if any questions or problems should arise.  injection was administered by Federico Weber MD Euflexxa injection # 3 - Right knee joint Lot #: [id number] Exp: 05- Man: Yehuda NDC: [id number].
[de-identified] : Jonah returns today to begin a new cycle of Euflexxa injections. He has symptomatic DJD In his right knee. He has had nine months of effective symptomatic improvement from his last series of Euflexxa injections in October 2023. He has developed a recent increase in stiffness and pain in his right knee. He has modified his activities, applied ice and took some Tylenol without much relief.  CC: right knee pain and stiffness DX: right knee DJD  Under strict sterile technique, the right knee was prepped with Chloraprep. Using the superolateral approach, with the patient supine, a 2mL injection of Euflexxa was administered intra-articularly. The patient tolerated the procedure well. The patient was instructed to avoid vigorous exercise for 24 hours and will apply ice to the knee for 20 minutes 2-3 times per day if discomfort occurs. Patient will return next week for the next injection in the series. The patient will call if any questions or problems should arise.  injection was administered by Federico Weber MD Euflexxa injection # 3 - Right knee joint Lot #: [id number] Exp: 05- Man: Yehuda NDC: [id number].
No. CLAUDIA screening performed.  STOP BANG Legend: 0-2 = LOW Risk; 3-4 = INTERMEDIATE Risk; 5-8 = HIGH Risk

## 2025-02-25 ENCOUNTER — NON-APPOINTMENT (OUTPATIENT)
Age: 75
End: 2025-02-25

## 2025-02-25 ENCOUNTER — APPOINTMENT (OUTPATIENT)
Dept: ORTHOPEDIC SURGERY | Facility: CLINIC | Age: 75
End: 2025-02-25
Payer: MEDICARE

## 2025-02-25 VITALS
HEART RATE: 74 BPM | OXYGEN SATURATION: 97 % | TEMPERATURE: 97.9 F | WEIGHT: 165 LBS | HEIGHT: 67 IN | BODY MASS INDEX: 25.9 KG/M2 | DIASTOLIC BLOOD PRESSURE: 106 MMHG | SYSTOLIC BLOOD PRESSURE: 195 MMHG

## 2025-02-25 PROCEDURE — 20610 DRAIN/INJ JOINT/BURSA W/O US: CPT | Mod: RT

## 2025-03-04 ENCOUNTER — RESULT REVIEW (OUTPATIENT)
Age: 75
End: 2025-03-04

## 2025-03-04 ENCOUNTER — APPOINTMENT (OUTPATIENT)
Dept: ORTHOPEDIC SURGERY | Facility: CLINIC | Age: 75
End: 2025-03-04
Payer: MEDICARE

## 2025-03-04 ENCOUNTER — OUTPATIENT (OUTPATIENT)
Dept: OUTPATIENT SERVICES | Facility: HOSPITAL | Age: 75
LOS: 1 days | End: 2025-03-04
Payer: MEDICARE

## 2025-03-04 VITALS
SYSTOLIC BLOOD PRESSURE: 155 MMHG | OXYGEN SATURATION: 97 % | WEIGHT: 165 LBS | HEIGHT: 67 IN | DIASTOLIC BLOOD PRESSURE: 94 MMHG | TEMPERATURE: 98 F | HEART RATE: 80 BPM | BODY MASS INDEX: 25.9 KG/M2

## 2025-03-04 PROCEDURE — 73564 X-RAY EXAM KNEE 4 OR MORE: CPT | Mod: 26,50

## 2025-03-04 PROCEDURE — 99213 OFFICE O/P EST LOW 20 MIN: CPT | Mod: 25

## 2025-03-04 PROCEDURE — 73564 X-RAY EXAM KNEE 4 OR MORE: CPT

## 2025-03-04 PROCEDURE — 20610 DRAIN/INJ JOINT/BURSA W/O US: CPT | Mod: RT

## 2025-03-11 ENCOUNTER — APPOINTMENT (OUTPATIENT)
Dept: ORTHOPEDIC SURGERY | Facility: CLINIC | Age: 75
End: 2025-03-11
Payer: MEDICARE

## 2025-03-11 VITALS
OXYGEN SATURATION: 97 % | HEART RATE: 78 BPM | HEIGHT: 67 IN | TEMPERATURE: 98.2 F | BODY MASS INDEX: 25.9 KG/M2 | WEIGHT: 165 LBS

## 2025-03-11 DIAGNOSIS — M17.11 UNILATERAL PRIMARY OSTEOARTHRITIS, RIGHT KNEE: ICD-10-CM

## 2025-03-11 PROCEDURE — 20610 DRAIN/INJ JOINT/BURSA W/O US: CPT | Mod: RT

## 2025-03-14 PROBLEM — M17.11 PRIMARY OSTEOARTHRITIS OF RIGHT KNEE: Status: ACTIVE | Noted: 2025-03-14

## 2025-03-14 PROBLEM — M25.561 CHRONIC PAIN OF RIGHT KNEE: Status: ACTIVE | Noted: 2025-03-14

## 2025-03-17 ENCOUNTER — OUTPATIENT (OUTPATIENT)
Dept: OUTPATIENT SERVICES | Facility: HOSPITAL | Age: 75
LOS: 1 days | End: 2025-03-17
Payer: MEDICARE

## 2025-03-17 ENCOUNTER — APPOINTMENT (OUTPATIENT)
Dept: ORTHOPEDIC SURGERY | Facility: CLINIC | Age: 75
End: 2025-03-17
Payer: MEDICARE

## 2025-03-17 ENCOUNTER — APPOINTMENT (OUTPATIENT)
Dept: RADIOLOGY | Facility: CLINIC | Age: 75
End: 2025-03-17

## 2025-03-17 VITALS
WEIGHT: 165 LBS | DIASTOLIC BLOOD PRESSURE: 105 MMHG | HEART RATE: 69 BPM | BODY MASS INDEX: 25.9 KG/M2 | SYSTOLIC BLOOD PRESSURE: 182 MMHG | HEIGHT: 67 IN | OXYGEN SATURATION: 97 %

## 2025-03-17 DIAGNOSIS — M17.11 UNILATERAL PRIMARY OSTEOARTHRITIS, RIGHT KNEE: ICD-10-CM

## 2025-03-17 DIAGNOSIS — M25.561 PAIN IN RIGHT KNEE: ICD-10-CM

## 2025-03-17 DIAGNOSIS — G89.29 PAIN IN RIGHT KNEE: ICD-10-CM

## 2025-03-17 PROCEDURE — 77073 BONE LENGTH STUDIES: CPT

## 2025-03-17 PROCEDURE — 99215 OFFICE O/P EST HI 40 MIN: CPT

## 2025-03-17 PROCEDURE — 77073 BONE LENGTH STUDIES: CPT | Mod: 26

## 2025-03-19 ENCOUNTER — NON-APPOINTMENT (OUTPATIENT)
Age: 75
End: 2025-03-19

## 2025-03-19 DIAGNOSIS — Z86.79 PERSONAL HISTORY OF OTHER DISEASES OF THE CIRCULATORY SYSTEM: ICD-10-CM

## 2025-03-19 DIAGNOSIS — Z86.19 PERSONAL HISTORY OF OTHER INFECTIOUS AND PARASITIC DISEASES: ICD-10-CM

## 2025-03-19 RX ORDER — AMMONIUM LACTATE 12 %
12 CREAM (GRAM) TOPICAL
Refills: 0 | Status: ACTIVE | COMMUNITY